# Patient Record
Sex: FEMALE | URBAN - METROPOLITAN AREA
[De-identification: names, ages, dates, MRNs, and addresses within clinical notes are randomized per-mention and may not be internally consistent; named-entity substitution may affect disease eponyms.]

---

## 2019-06-21 ENCOUNTER — OFFICE VISIT (OUTPATIENT)
Dept: URBAN - METROPOLITAN AREA CLINIC 22 | Facility: CLINIC | Age: 69
End: 2019-06-21
Payer: MEDICARE

## 2019-06-21 DIAGNOSIS — H52.03 HYPERMETROPIA, BILATERAL: ICD-10-CM

## 2019-06-21 DIAGNOSIS — H16.142 PUNCTATE KERATITIS, LEFT EYE: Primary | ICD-10-CM

## 2019-06-21 PROCEDURE — 92014 COMPRE OPH EXAM EST PT 1/>: CPT | Performed by: OPTOMETRIST

## 2019-06-21 ASSESSMENT — INTRAOCULAR PRESSURE
OD: 14
OS: 15

## 2019-06-21 ASSESSMENT — VISUAL ACUITY
OS: 20/25
OD: 20/20

## 2019-06-21 NOTE — IMPRESSION/PLAN
Impression: Punctate keratitis, left eye: H16.142. OS. Plan: Lubricate with Sustain Complete or Refresh Advanced 4 times a day.